# Patient Record
Sex: MALE | Race: WHITE
[De-identification: names, ages, dates, MRNs, and addresses within clinical notes are randomized per-mention and may not be internally consistent; named-entity substitution may affect disease eponyms.]

---

## 2022-11-25 ENCOUNTER — HOSPITAL ENCOUNTER (INPATIENT)
Dept: HOSPITAL 4 - SED | Age: 57
LOS: 1 days | Discharge: HOME | DRG: 283 | End: 2022-11-26
Attending: INTERNAL MEDICINE | Admitting: INTERNAL MEDICINE
Payer: MEDICAID

## 2022-11-25 VITALS — SYSTOLIC BLOOD PRESSURE: 98 MMHG

## 2022-11-25 VITALS — SYSTOLIC BLOOD PRESSURE: 154 MMHG

## 2022-11-25 VITALS — SYSTOLIC BLOOD PRESSURE: 116 MMHG

## 2022-11-25 VITALS — HEIGHT: 70 IN | WEIGHT: 176 LBS | BODY MASS INDEX: 25.2 KG/M2

## 2022-11-25 VITALS — SYSTOLIC BLOOD PRESSURE: 118 MMHG

## 2022-11-25 VITALS — SYSTOLIC BLOOD PRESSURE: 152 MMHG

## 2022-11-25 VITALS — SYSTOLIC BLOOD PRESSURE: 128 MMHG

## 2022-11-25 DIAGNOSIS — I85.11: ICD-10-CM

## 2022-11-25 DIAGNOSIS — I86.4: ICD-10-CM

## 2022-11-25 DIAGNOSIS — K20.90: ICD-10-CM

## 2022-11-25 DIAGNOSIS — F10.20: ICD-10-CM

## 2022-11-25 DIAGNOSIS — Z20.822: ICD-10-CM

## 2022-11-25 DIAGNOSIS — D68.9: ICD-10-CM

## 2022-11-25 DIAGNOSIS — K74.60: Primary | ICD-10-CM

## 2022-11-25 DIAGNOSIS — I10: ICD-10-CM

## 2022-11-25 DIAGNOSIS — K29.70: ICD-10-CM

## 2022-11-25 DIAGNOSIS — D62: ICD-10-CM

## 2022-11-25 DIAGNOSIS — E44.0: ICD-10-CM
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
## 2022-11-25 LAB
ALBUMIN SERPL BCP-MCNC: 2.7 G/DL (ref 3.4–4.8)
ALT SERPL W P-5'-P-CCNC: 47 U/L (ref 12–78)
ANION GAP SERPL CALCULATED.3IONS-SCNC: 14 MMOL/L (ref 5–15)
AST SERPL W P-5'-P-CCNC: 84 U/L (ref 10–37)
BASOPHILS # BLD AUTO: 0 K/UL (ref 0–0.2)
BASOPHILS # BLD AUTO: 0.1 K/UL (ref 0–0.2)
BASOPHILS # BLD AUTO: 0.1 K/UL (ref 0–0.2)
BASOPHILS NFR BLD AUTO: 0.3 % (ref 0–2)
BASOPHILS NFR BLD AUTO: 0.5 % (ref 0–2)
BASOPHILS NFR BLD AUTO: 0.7 % (ref 0–2)
BILIRUB SERPL-MCNC: 2.2 MG/DL (ref 0–1)
BUN SERPL-MCNC: 21 MG/DL (ref 8–21)
CALCIUM SERPL-MCNC: 8.8 MG/DL (ref 8.4–11)
CHLORIDE SERPL-SCNC: 106 MMOL/L (ref 98–107)
CREAT SERPL-MCNC: 1.13 MG/DL (ref 0.55–1.3)
EOSINOPHIL # BLD AUTO: 0 K/UL (ref 0–0.4)
EOSINOPHIL NFR BLD AUTO: 0 % (ref 0–4)
ERYTHROCYTE [DISTWIDTH] IN BLOOD BY AUTOMATED COUNT: 15.5 % (ref 9–15)
ERYTHROCYTE [DISTWIDTH] IN BLOOD BY AUTOMATED COUNT: 15.5 % (ref 9–15)
ERYTHROCYTE [DISTWIDTH] IN BLOOD BY AUTOMATED COUNT: 15.6 % (ref 9–15)
GFR SERPL CREATININE-BSD FRML MDRD: 85 ML/MIN (ref 90–?)
GLUCOSE SERPL-MCNC: 202 MG/DL (ref 70–99)
HCT VFR BLD AUTO: 22.1 % (ref 36–54)
HCT VFR BLD AUTO: 24.2 % (ref 36–54)
HCT VFR BLD AUTO: 26.1 % (ref 36–54)
HCT VFR BLD AUTO: 27 % (ref 36–54)
HGB BLD-MCNC: 7.5 G/DL (ref 14–18)
HGB BLD-MCNC: 8.4 G/DL (ref 14–18)
HGB BLD-MCNC: 9 G/DL (ref 14–18)
HGB BLD-MCNC: 9.1 G/DL (ref 14–18)
INR PPP: 1.4 (ref 0.8–1.2)
LIPASE SERPL-CCNC: 160 U/L (ref 73–393)
LYMPHOCYTES # BLD AUTO: 1.2 K/UL (ref 1–5.5)
LYMPHOCYTES # BLD AUTO: 1.3 K/UL (ref 1–5.5)
LYMPHOCYTES # BLD AUTO: 1.5 K/UL (ref 1–5.5)
LYMPHOCYTES NFR BLD AUTO: 11.3 % (ref 20.5–51.5)
LYMPHOCYTES NFR BLD AUTO: 11.8 % (ref 20.5–51.5)
LYMPHOCYTES NFR BLD AUTO: 11.9 % (ref 20.5–51.5)
MCH RBC QN AUTO: 31 PG (ref 27–31)
MCH RBC QN AUTO: 32 PG (ref 27–31)
MCH RBC QN AUTO: 32 PG (ref 27–31)
MCHC RBC AUTO-ENTMCNC: 34 % (ref 32–36)
MCHC RBC AUTO-ENTMCNC: 34 % (ref 32–36)
MCHC RBC AUTO-ENTMCNC: 35 % (ref 32–36)
MCV RBC AUTO: 91 FL (ref 79–98)
MCV RBC AUTO: 92 FL (ref 79–98)
MCV RBC AUTO: 92 FL (ref 79–98)
MONOCYTES # BLD MANUAL: 0.8 K/UL (ref 0–1)
MONOCYTES # BLD MANUAL: 0.8 K/UL (ref 0–1)
MONOCYTES # BLD MANUAL: 1.2 K/UL (ref 0–1)
MONOCYTES # BLD MANUAL: 7.6 % (ref 1.7–9.3)
MONOCYTES # BLD MANUAL: 7.7 % (ref 1.7–9.3)
MONOCYTES # BLD MANUAL: 9.3 % (ref 1.7–9.3)
NEUTROPHILS # BLD AUTO: 10.6 K/UL (ref 1.8–7.7)
NEUTROPHILS # BLD AUTO: 8.2 K/UL (ref 1.8–7.7)
NEUTROPHILS # BLD AUTO: 8.8 K/UL (ref 1.8–7.7)
NEUTROPHILS NFR BLD AUTO: 78.7 % (ref 40–70)
NEUTROPHILS NFR BLD AUTO: 80 % (ref 40–70)
NEUTROPHILS NFR BLD AUTO: 80.2 % (ref 40–70)
PLATELET # BLD AUTO: 155 K/UL (ref 130–430)
PLATELET # BLD AUTO: 182 K/UL (ref 130–430)
PLATELET # BLD AUTO: 229 K/UL (ref 130–430)
PROTHROMBIN TIME: 13.9 SECS (ref 9.5–12.5)
RBC # BLD AUTO: 2.39 MIL/UL (ref 4.2–6.2)
RBC # BLD AUTO: 2.64 MIL/UL (ref 4.2–6.2)
RBC # BLD AUTO: 2.96 MIL/UL (ref 4.2–6.2)
WBC # BLD AUTO: 10.3 K/UL (ref 4.8–10.8)
WBC # BLD AUTO: 10.9 K/UL (ref 4.8–10.8)
WBC # BLD AUTO: 13.5 K/UL (ref 4.8–10.8)

## 2022-11-25 PROCEDURE — P9021 RED BLOOD CELLS UNIT: HCPCS

## 2022-11-25 PROCEDURE — 5A1935Z RESPIRATORY VENTILATION, LESS THAN 24 CONSECUTIVE HOURS: ICD-10-PCS

## 2022-11-25 PROCEDURE — C9113 INJ PANTOPRAZOLE SODIUM, VIA: HCPCS

## 2022-11-25 PROCEDURE — 06L38CZ OCCLUSION OF ESOPHAGEAL VEIN WITH EXTRALUMINAL DEVICE, VIA NATURAL OR ARTIFICIAL OPENING ENDOSCOPIC: ICD-10-PCS | Performed by: INTERNAL MEDICINE

## 2022-11-25 PROCEDURE — 0BH17EZ INSERTION OF ENDOTRACHEAL AIRWAY INTO TRACHEA, VIA NATURAL OR ARTIFICIAL OPENING: ICD-10-PCS

## 2022-11-25 PROCEDURE — 30233N1 TRANSFUSION OF NONAUTOLOGOUS RED BLOOD CELLS INTO PERIPHERAL VEIN, PERCUTANEOUS APPROACH: ICD-10-PCS | Performed by: INTERNAL MEDICINE

## 2022-11-25 RX ADMIN — SODIUM CHLORIDE SCH MLS/HR: 0.9 INJECTION, SOLUTION INTRAVENOUS at 18:19

## 2022-11-25 RX ADMIN — SODIUM CHLORIDE SCH MLS/HR: 0.9 INJECTION, SOLUTION INTRAVENOUS at 16:57

## 2022-11-25 RX ADMIN — SODIUM CHLORIDE SCH MLS/HR: 9 INJECTION, SOLUTION INTRAVENOUS at 17:15

## 2022-11-25 NOTE — NUR
DR. MOORE AT BEDSIDE TO INITIATE ETT. 20MG IVP ETOMIDATE AND 120MG IVP GIVEN. 
R/T ASSISTING AT BEDSIDE. 

7.52 ETT PLACED AT 22 LIPLINE. 

PROPOFOL INITIATED AT 20MG/KG/MIN AS PER DR. MOORE. 

PT AGITATED AND FIGHTING VENT, 20MG PROPOFOL IVP GIVEN. 

PT MAINTAINING AGITATION PROPOFOL INCREASED TO 40MCG/KG/MIN. 

PT FIGHTING VENT 20MG IVP GIVEN X2. 

FENTANYL 25MCG/HOUR INITATED. 

PT HYPOTENSIVE 88/45 500 CC BOLUS INTIATED. FENTANYL STOPPED. 

PT FIGHTING VENT 30MCG PROPOFOL IVP GIVEN IN A SERIES OF 3 IVP. 

PT B/P INPROVED 175/ 60. FENTANYL STARTED AGAIN AT 25MCG/HOUR. 

PT RECEIVED 2 NEW IV LINES, 20G TO RH AND 20 TO LH.

## 2022-11-25 NOTE — NUR
DR. WAHL AND DR. ESTEVES MADE AWARE PT IS VOMITING BRIGHT RED BLOOD X2. PT HGB 
WAS 9.1 AT 1100 AND THE NEW HGB IS 7/5. DR. ESTEVES GAVE ORDER TO INTUBATE PT, 
OBTAIN CONSENT FOR STAT EGD. GIVE 2 UNIT PRBC WIDE OPEN WITHIN 2 HOURS.

## 2022-11-25 NOTE — NUR
HIGH ALERT NOTE:

Called Dr. Oropeza back at 2215 identified within the medical roster to verify physician 
authenticity. For Diprivan drip order.



Orders also received,

1.NS @ 100ml/hr

2.Continue Diprivan drip for sedation while on Vent.

3.May start Levophed drip if needed to maintain BP.

4.Transfuse 1unit of PRBC

5.Transfuse 2units of FFP after unit of PRBC

6.CBC 1hour post transfusion of PRBC & FFP.

## 2022-11-25 NOTE — NUR
Patient received in ICU from GI nurses. Patient intubated/sedated. Friend in waiting room. 
Orders to be completed. New orders to be clarified and received. Charge RN and bedside RN 
unsure as to what the endorsed transfer order is. Dr Oropeza to be paged to clarify. Patient 
pulling at lines tubes and requiring increased sedation.

## 2022-11-25 NOTE — NUR
RECEIVED PT FROM JOHN LINDSEY. PT BIB FRIEND FOR GENERALIZED WEAKNESS, N/V AND 
STATES HIS B/P IS HIGH. TELEMONITOR SHOWS SINUS TACH HR 130S., RESP 30BPM, 
NORMOTHERMIC. SKIN PALE, INTACT, NO EDEMA. DISTAL PULSES NORMAL. DENIES PAIN. 
SIDERAILS UP X2.

## 2022-11-25 NOTE — NUR
Admit bed requested 



Patient will be admitted to care of .  

Admitted to ICU unit.

Diagnosis GI BLEED

Inpatient (Yes or No)  Y

Observation (Yes or No) N

Orientation concerns or request close to nursing station  (Yes or No) N

Covid Status -

On vent or bipap Y

Isolation requirements INFLUENZA B+

Needs a sitter 

From Home (Yes or if No enter name of facility) 

Requires Dialysis (Yes or No) N

Med Rec Completed (Yes of No) Y

## 2022-11-25 NOTE — NUR
Dr Drummond notified of consult and patient ABG/vent settings. Ordered increase of Propofol up 
to 70mcg/kg/min PRN as patient continues to pull at lines/tubes under current sedation.

## 2022-11-25 NOTE — NUR
CONSULTATION PAGED/CALLED

Reason for Consultation: Intubated

Person Who was Notified: Camilla

Consulting Physician: Dr Dickerson 

Consultant Specialty: Pulmonary

Ordering Physician: Dr Oropeza

## 2022-11-25 NOTE — NUR
PT GIVEN PROTONIX 80MG IVP FOR GI BLEED. INFORMED CONSENT OBTAINED FOR CT SCAN 
WITH CONTRAST, PT TAKEN TO C/T SCAN AT THIS TIME.

## 2022-11-26 VITALS — SYSTOLIC BLOOD PRESSURE: 104 MMHG

## 2022-11-26 VITALS — SYSTOLIC BLOOD PRESSURE: 88 MMHG

## 2022-11-26 LAB
BASOPHILS # BLD AUTO: 0.1 K/UL (ref 0–0.2)
BASOPHILS NFR BLD AUTO: 0.5 % (ref 0–2)
EOSINOPHIL # BLD AUTO: 0 K/UL (ref 0–0.4)
EOSINOPHIL NFR BLD AUTO: 0.1 % (ref 0–4)
ERYTHROCYTE [DISTWIDTH] IN BLOOD BY AUTOMATED COUNT: 15.4 % (ref 9–15)
HCT VFR BLD AUTO: 26.3 % (ref 36–54)
HGB BLD-MCNC: 9 G/DL (ref 14–18)
LYMPHOCYTES # BLD AUTO: 2.1 K/UL (ref 1–5.5)
LYMPHOCYTES NFR BLD AUTO: 14.9 % (ref 20.5–51.5)
MCH RBC QN AUTO: 32 PG (ref 27–31)
MCHC RBC AUTO-ENTMCNC: 34 % (ref 32–36)
MCV RBC AUTO: 92 FL (ref 79–98)
MONOCYTES # BLD MANUAL: 1 K/UL (ref 0–1)
MONOCYTES # BLD MANUAL: 7 % (ref 1.7–9.3)
NEUTROPHILS # BLD AUTO: 10.7 K/UL (ref 1.8–7.7)
NEUTROPHILS NFR BLD AUTO: 77.5 % (ref 40–70)
PLATELET # BLD AUTO: 161 K/UL (ref 130–430)
RBC # BLD AUTO: 2.87 MIL/UL (ref 4.2–6.2)
WBC # BLD AUTO: 13.8 K/UL (ref 4.8–10.8)

## 2022-11-26 RX ADMIN — SODIUM CHLORIDE SCH MLS/HR: 9 INJECTION, SOLUTION INTRAVENOUS at 00:12

## 2022-11-26 NOTE — NUR
Report given to [facility] transport and called to [facility] trauma unit nurse Vamshi. 
Patient's friend Joey called as well. See vital signs immediately prior to transfer. 1L 
bolus currently infusing, unit of PRBC currently infusing upon transfer.